# Patient Record
Sex: FEMALE | Race: WHITE | NOT HISPANIC OR LATINO | URBAN - METROPOLITAN AREA
[De-identification: names, ages, dates, MRNs, and addresses within clinical notes are randomized per-mention and may not be internally consistent; named-entity substitution may affect disease eponyms.]

---

## 2017-02-27 ENCOUNTER — OFFICE (OUTPATIENT)
Dept: URBAN - METROPOLITAN AREA CLINIC 78 | Facility: CLINIC | Age: 37
End: 2017-02-27
Payer: MEDICAID

## 2017-02-27 VITALS
WEIGHT: 202 LBS | DIASTOLIC BLOOD PRESSURE: 74 MMHG | TEMPERATURE: 97.9 F | HEIGHT: 61 IN | SYSTOLIC BLOOD PRESSURE: 109 MMHG | HEART RATE: 74 BPM

## 2017-02-27 DIAGNOSIS — K21.0 GASTRO-ESOPHAGEAL REFLUX DISEASE WITH ESOPHAGITIS: ICD-10-CM

## 2017-02-27 DIAGNOSIS — R14.2 ERUCTATION: ICD-10-CM

## 2017-02-27 PROCEDURE — 99214 OFFICE O/P EST MOD 30 MIN: CPT

## 2017-02-27 NOTE — SERVICEHPINOTES
38 yo female presents for f/u heartburn, gas, and bloating. She notes improvement in heartburn with Dexilant but has had worsened issues with gas and bloating. Can still have episodes of acid reflux. Symptoms tend to worsen later in the day. She has cut back considerably on sodas and junk food and has lost some weight. Denies abdominal pain. She reports 1-2 normal BMs per day. Her EGD in October showed benign findings. Just had labs with her PCP last week - no results yet.

## 2020-08-06 ENCOUNTER — TELEHEALTH PROVIDED OTHER THAN IN PATIENT'S HOME (OUTPATIENT)
Dept: URBAN - METROPOLITAN AREA TELEHEALTH 7 | Facility: TELEHEALTH | Age: 40
End: 2020-08-06
Payer: MEDICAID

## 2020-08-06 VITALS — HEIGHT: 61 IN | WEIGHT: 193 LBS

## 2020-08-06 DIAGNOSIS — R11.0 NAUSEA: ICD-10-CM

## 2020-08-06 DIAGNOSIS — R14.0 ABDOMINAL DISTENSION (GASEOUS): ICD-10-CM

## 2020-08-06 DIAGNOSIS — R12 HEARTBURN: ICD-10-CM

## 2020-08-06 DIAGNOSIS — R19.4 CHANGE IN BOWEL HABIT: ICD-10-CM

## 2020-08-06 PROCEDURE — 99204 OFFICE O/P NEW MOD 45 MIN: CPT | Mod: 95 | Performed by: PHYSICIAN ASSISTANT

## 2020-08-06 RX ORDER — PANTOPRAZOLE SODIUM 40 MG/1
TABLET, DELAYED RELEASE ORAL
Qty: 0 | Refills: 0 | Status: COMPLETED
End: 2021-02-11

## 2020-08-06 NOTE — SERVICEHPINOTES
PATIENT VERIFIED BY DATE OF BIRTH AND NAME. Patient has been consented for this telecommunication visit.   JUSTA REYES   is a   40   year old    female who is being seen in consultation at the request of   ISHMAEL MARIE   for digestive symptoms. Patient notes h/o GERD but in the past 2 months she notes frequent nausea, bloating, belching, and heartburn. She has symptoms daily, can be worse toward evening. She has tried multiple OTC meds. At first, Gas-X helped for about 5 days but then stopped working. Acid blocking meds have not been helpful. IBgard also not helpful. Patient reports an abdominal U/S recently and was told it was normal. She notes irregular bowel habits which started at same time as other symptoms. She will go up to 5 days without a BM, then can have diarrhea, then cycles back to constipation. No blood in stools. No vomiting or fevers. She had a partial hysterectomy on May 5th. She started having all her GI symptoms in mid-June. Seen here in 2016/2017 for heartburn, gas, and bloating. At that time, she had improvement in heartburn with Dexilant but had worsened issues with gas and bloating. Symptoms tended to worsen later in the day. Her EGD in October 2016 showed benign findings.

## 2020-08-17 LAB
AMBIG ABBREV CMP14 DEFAULT: (no result)
CBC/DIFF AMBIGUOUS DEFAULT: BASO (ABSOLUTE): 0 X10E3/UL (ref 0–0.2)
CBC/DIFF AMBIGUOUS DEFAULT: BASOS: 1 %
CBC/DIFF AMBIGUOUS DEFAULT: EOS (ABSOLUTE): 0.1 X10E3/UL (ref 0–0.4)
CBC/DIFF AMBIGUOUS DEFAULT: EOS: 2 %
CBC/DIFF AMBIGUOUS DEFAULT: HEMATOCRIT: 40.3 % (ref 34–46.6)
CBC/DIFF AMBIGUOUS DEFAULT: HEMATOLOGY COMMENTS: (no result)
CBC/DIFF AMBIGUOUS DEFAULT: HEMOGLOBIN: 12.4 G/DL (ref 11.1–15.9)
CBC/DIFF AMBIGUOUS DEFAULT: IMMATURE CELLS: (no result)
CBC/DIFF AMBIGUOUS DEFAULT: IMMATURE GRANS (ABS): 0 X10E3/UL (ref 0–0.1)
CBC/DIFF AMBIGUOUS DEFAULT: IMMATURE GRANULOCYTES: 0 %
CBC/DIFF AMBIGUOUS DEFAULT: LYMPHS (ABSOLUTE): 1.7 X10E3/UL (ref 0.7–3.1)
CBC/DIFF AMBIGUOUS DEFAULT: LYMPHS: 26 %
CBC/DIFF AMBIGUOUS DEFAULT: MCH: 29.1 PG (ref 26.6–33)
CBC/DIFF AMBIGUOUS DEFAULT: MCHC: 30.8 G/DL — LOW (ref 31.5–35.7)
CBC/DIFF AMBIGUOUS DEFAULT: MCV: 95 FL (ref 79–97)
CBC/DIFF AMBIGUOUS DEFAULT: MONOCYTES(ABSOLUTE): 0.5 X10E3/UL (ref 0.1–0.9)
CBC/DIFF AMBIGUOUS DEFAULT: MONOCYTES: 7 %
CBC/DIFF AMBIGUOUS DEFAULT: NEUTROPHILS (ABSOLUTE): 4.3 X10E3/UL (ref 1.4–7)
CBC/DIFF AMBIGUOUS DEFAULT: NEUTROPHILS: 64 %
CBC/DIFF AMBIGUOUS DEFAULT: NRBC: (no result)
CBC/DIFF AMBIGUOUS DEFAULT: PLATELETS: 359 X10E3/UL (ref 150–450)
CBC/DIFF AMBIGUOUS DEFAULT: RBC: 4.26 X10E6/UL (ref 3.77–5.28)
CBC/DIFF AMBIGUOUS DEFAULT: RDW: 14.3 % (ref 11.7–15.4)
CBC/DIFF AMBIGUOUS DEFAULT: WBC: 6.6 X10E3/UL (ref 3.4–10.8)
COMP. METABOLIC PANEL (14): A/G RATIO: 1.6 (ref 1.2–2.2)
COMP. METABOLIC PANEL (14): ALBUMIN: 4.1 G/DL (ref 3.8–4.8)
COMP. METABOLIC PANEL (14): ALKALINE PHOSPHATASE: 81 IU/L (ref 39–117)
COMP. METABOLIC PANEL (14): ALT (SGPT): 16 IU/L (ref 0–32)
COMP. METABOLIC PANEL (14): AST (SGOT): 13 IU/L (ref 0–40)
COMP. METABOLIC PANEL (14): BILIRUBIN, TOTAL: <0.2 MG/DL
COMP. METABOLIC PANEL (14): BUN/CREATININE RATIO: 6 — LOW (ref 9–23)
COMP. METABOLIC PANEL (14): BUN: 6 MG/DL (ref 6–24)
COMP. METABOLIC PANEL (14): CALCIUM: 9.5 MG/DL (ref 8.7–10.2)
COMP. METABOLIC PANEL (14): CARBON DIOXIDE, TOTAL: 21 MMOL/L (ref 20–29)
COMP. METABOLIC PANEL (14): CHLORIDE: 109 MMOL/L — HIGH (ref 96–106)
COMP. METABOLIC PANEL (14): CREATININE: 0.98 MG/DL (ref 0.57–1)
COMP. METABOLIC PANEL (14): EGFR IF AFRICN AM: 82 ML/MIN/1.73 (ref 59–?)
COMP. METABOLIC PANEL (14): EGFR IF NONAFRICN AM: 71 ML/MIN/1.73 (ref 59–?)
COMP. METABOLIC PANEL (14): GLOBULIN, TOTAL: 2.5 G/DL (ref 1.5–4.5)
COMP. METABOLIC PANEL (14): GLUCOSE: 118 MG/DL — HIGH (ref 65–99)
COMP. METABOLIC PANEL (14): POTASSIUM: 4 MMOL/L (ref 3.5–5.2)
COMP. METABOLIC PANEL (14): PROTEIN, TOTAL: 6.6 G/DL (ref 6–8.5)
COMP. METABOLIC PANEL (14): SODIUM: 144 MMOL/L (ref 134–144)

## 2020-08-24 ENCOUNTER — OFFICE (OUTPATIENT)
Dept: URBAN - METROPOLITAN AREA CLINIC 34 | Facility: CLINIC | Age: 40
End: 2020-08-24
Payer: MEDICAID

## 2020-08-24 DIAGNOSIS — Z11.59 ENCOUNTER FOR SCREENING FOR OTHER VIRAL DISEASES: ICD-10-CM

## 2020-08-24 PROCEDURE — 99211 OFF/OP EST MAY X REQ PHY/QHP: CPT | Mod: 25,CS | Performed by: INTERNAL MEDICINE

## 2020-08-27 ENCOUNTER — OFFICE (OUTPATIENT)
Dept: URBAN - METROPOLITAN AREA CLINIC 98 | Facility: CLINIC | Age: 40
End: 2020-08-27
Payer: MEDICAID

## 2020-08-27 VITALS
DIASTOLIC BLOOD PRESSURE: 49 MMHG | RESPIRATION RATE: 12 BRPM | SYSTOLIC BLOOD PRESSURE: 126 MMHG | DIASTOLIC BLOOD PRESSURE: 78 MMHG | OXYGEN SATURATION: 97 % | OXYGEN SATURATION: 95 % | HEART RATE: 83 BPM | SYSTOLIC BLOOD PRESSURE: 114 MMHG | OXYGEN SATURATION: 90 % | SYSTOLIC BLOOD PRESSURE: 107 MMHG | TEMPERATURE: 97.9 F | RESPIRATION RATE: 25 BRPM | DIASTOLIC BLOOD PRESSURE: 69 MMHG | SYSTOLIC BLOOD PRESSURE: 137 MMHG | HEART RATE: 105 BPM | SYSTOLIC BLOOD PRESSURE: 120 MMHG | HEART RATE: 93 BPM | RESPIRATION RATE: 26 BRPM | DIASTOLIC BLOOD PRESSURE: 72 MMHG | OXYGEN SATURATION: 99 % | HEART RATE: 108 BPM | SYSTOLIC BLOOD PRESSURE: 115 MMHG | WEIGHT: 193 LBS | HEIGHT: 61 IN | SYSTOLIC BLOOD PRESSURE: 112 MMHG | HEART RATE: 86 BPM | OXYGEN SATURATION: 100 % | OXYGEN SATURATION: 96 % | RESPIRATION RATE: 16 BRPM | HEART RATE: 89 BPM | HEART RATE: 104 BPM | TEMPERATURE: 98.1 F | DIASTOLIC BLOOD PRESSURE: 80 MMHG | DIASTOLIC BLOOD PRESSURE: 92 MMHG

## 2020-08-27 DIAGNOSIS — R12 HEARTBURN: ICD-10-CM

## 2020-08-27 DIAGNOSIS — K31.89 OTHER DISEASES OF STOMACH AND DUODENUM: ICD-10-CM

## 2020-08-27 DIAGNOSIS — R14.0 ABDOMINAL DISTENSION (GASEOUS): ICD-10-CM

## 2020-08-27 DIAGNOSIS — R11.0 NAUSEA: ICD-10-CM

## 2020-08-27 DIAGNOSIS — K29.60 OTHER GASTRITIS WITHOUT BLEEDING: ICD-10-CM

## 2020-08-27 RX ORDER — LINACLOTIDE 145 UG/1
145 CAPSULE, GELATIN COATED ORAL
Qty: 16 | Refills: 0 | Status: COMPLETED
Start: 2020-08-27 | End: 2022-08-31

## 2020-08-27 NOTE — SERVICEHPINOTES
Pt presents for endoscopic evaluation. She had planned to have both EGD and colonoscopy, but she was unable to tolerate any of the prep. Instead, she prefers to proceed with EGD alone. She has been having symptoms of bloating, heartburn, and nausea since surgery 5/2020. Constipated, going 5-9 days without BM strains to pass stool, but incomplete evacuation afterwards.

## 2021-02-11 ENCOUNTER — OFFICE (OUTPATIENT)
Dept: URBAN - METROPOLITAN AREA TELEHEALTH 12 | Facility: TELEHEALTH | Age: 41
End: 2021-02-11
Payer: MEDICAID

## 2021-02-11 VITALS — WEIGHT: 184 LBS | HEIGHT: 61 IN

## 2021-02-11 DIAGNOSIS — K21.00 GASTRO-ESOPHAGEAL REFLUX DISEASE WITH ESOPHAGITIS, WITHOUT B: ICD-10-CM

## 2021-02-11 DIAGNOSIS — R11.0 NAUSEA: ICD-10-CM

## 2021-02-11 DIAGNOSIS — Z86.16 PERSONAL HISTORY OF COVID-19: ICD-10-CM

## 2021-02-11 PROCEDURE — 99442: CPT | Performed by: INTERNAL MEDICINE

## 2021-02-11 NOTE — SERVICEHPINOTES
PATIENT VERIFIED BY DATE OF BIRTH AND NAME. Patient has been consented for this telecommunication visit.Pt presents for evaluation of nausea. Had been in usual state until 12/2020 when she contracted covid-19. Ever since then she has had issues with nausea she had EGD 8/2020 and had been relatively symptom-free GI-wise until covid. She reports constant nausea along with diminished PO intake. Present throughout the day, variable intensity though. Worse at night and sometimes wakes her from sleep also worse after eating, anywhere from immediately to an hour or so later. Sometimes worse with certain movements (rolling over in bed). Not eating much, but has been able to advance diet to some degree symptoms affecting work and quality of life. Vomits and regurgitates, usually undigested food. Has been taking zofran, which 9.9/10 times helps. Has been trying to eat small meals, grazing throughout the day. BMs are regular for her, no changes. Labs from PCP reportedly unremarkable. For past several days she has not needed zofran but then needed it last night, worst it had ever been. Had been otherwise improving until last night. She continues on her GERD regimen, no association of GERD with her symptoms (and vice versa).EGD 8/2020 showed mild non-erosive gastritis (confirmed on biopsies, also reactive gastropathy), but otherwise was unremarkable (biopsies from duodenum suspicious for celiac, but celiac profile negative).BRUS 7/2020 was unremarkable.10-point ROS completed with patient, pertinent positives/negatives outlined above.

## 2021-05-04 ENCOUNTER — TELEHEALTH PROVIDED OTHER THAN IN PATIENT'S HOME (OUTPATIENT)
Dept: URBAN - METROPOLITAN AREA TELEHEALTH 12 | Facility: TELEHEALTH | Age: 41
End: 2021-05-04
Payer: MEDICAID

## 2021-05-04 VITALS — WEIGHT: 184 LBS | HEIGHT: 61 IN

## 2021-05-04 DIAGNOSIS — R11.2 NAUSEA WITH VOMITING, UNSPECIFIED: ICD-10-CM

## 2021-05-04 DIAGNOSIS — E66.9 OBESITY, UNSPECIFIED: ICD-10-CM

## 2021-05-04 DIAGNOSIS — K30 FUNCTIONAL DYSPEPSIA: ICD-10-CM

## 2021-05-04 PROCEDURE — 99214 OFFICE O/P EST MOD 30 MIN: CPT | Mod: 95 | Performed by: PHYSICIAN ASSISTANT

## 2021-05-04 NOTE — SERVICEHPINOTES
PATIENT VERIFIED BY DATE OF BIRTH AND NAME. Patient has been consented for this telecommunication visit.   Ms. Galindo is here for f/u regarding nausea.   Had been in usual state until 12/2020 when she contracted covid-19. Ever since then she has had issues with nausea she had EGD 8/2020 and had been relatively symptom-free GI-wise until covid. She saw Dr. Deng who obtained a GI emptying scan which showed mildly delayed stomach emptying. She is worse with eating. Eating crackers, popsicles, bread, potatoes--don't tend to cause as much nausea. + early satiety. + postprandial bloating. She has to take off her bra after eating as any pressure on the abdomen causes issues. She took Reglan in the past and developed RLS and TD. Hasn't had blood work recently. She states she will "be ok" as long as she knows it is ok to take Zofran. As far as the zofran, she is using it a few times a week lately. No known DM or thyroid disease.   ROS as per HPI and otherwise is negative

## 2021-05-06 LAB
HEMOGLOBIN A1C: 5.5 % (ref 4.8–5.6)
THYROXINE (T4) FREE, DIRECT, S: T4,FREE(DIRECT): 0.99 NG/DL (ref 0.82–1.77)
TSH: 1.66 UIU/ML (ref 0.45–4.5)

## 2021-06-11 ENCOUNTER — TELEHEALTH PROVIDED OTHER THAN IN PATIENT'S HOME (OUTPATIENT)
Dept: URBAN - METROPOLITAN AREA TELEHEALTH 12 | Facility: TELEHEALTH | Age: 41
End: 2021-06-11
Payer: MEDICAID

## 2021-06-11 VITALS — HEIGHT: 61 IN | WEIGHT: 177 LBS

## 2021-06-11 DIAGNOSIS — R19.4 CHANGE IN BOWEL HABIT: ICD-10-CM

## 2021-06-11 DIAGNOSIS — K30 FUNCTIONAL DYSPEPSIA: ICD-10-CM

## 2021-06-11 DIAGNOSIS — R11.2 NAUSEA WITH VOMITING, UNSPECIFIED: ICD-10-CM

## 2021-06-11 PROCEDURE — 99213 OFFICE O/P EST LOW 20 MIN: CPT | Mod: 95 | Performed by: INTERNAL MEDICINE

## 2021-06-11 RX ORDER — ONDANSETRON HYDROCHLORIDE 4 MG/1
12 TABLET, FILM COATED ORAL
Qty: 30 | Refills: 3 | Status: COMPLETED
Start: 2021-01-12 | End: 2022-08-31

## 2021-06-11 NOTE — SERVICEHPINOTES
PATIENT VERIFIED BY DATE OF BIRTH AND NAME. Patient has been consented for this telecommunication visit.Pt presents for follow up of ongoing constant nausea and vomiting. Past few days has been OK, has been able to eat more. But prior to that she would have symptoms nearly every day. Has lost weight, about 20 lbs since onset of symptoms. Nothing clearly worsens symptoms just mere act of eating/drinking can trigger symptoms. No food associations. Zofran helps she takes this BID on "bad days" though some days she won't need it. Hasn't tried much else to help. Tried reglan previously, had adverse reaction (TD) to it. Has recently tried IBguard, which has been may have been helpful over the past week or so. BMs continue to vary - can have days of diarrhea (and vomiting), but then can go as long as 9 days without having BM. Nausea/vomiting definitely worse when constipated. Ongoing symptoms are affecting quality of life, family relations, etc. She distinctly correlates onset of symptoms to diagnosis of COVID-19 Dec 2020 prior tot hat she had been relatively symptom-free.Gastric emptying scan 3/2021 showed mildly delayed emptying.BRBarium swallow 4/2021 was normal.BRLab testing shows normal thyroid function and normal HbA1c (5.5%).BRPrevious EGD 8/2020 showed mild non-erosive gastritis (confirmed on biopsies, also reactive gastropathy), but otherwise was unremarkable (biopsies from duodenum suspicious for celiac, but celiac profile negative).BRUS 7/2020 was unremarkable.10-point ROS completed with patient, pertinent positives/negatives outlined above.

## 2021-06-29 PROBLEM — K31.89 OTHER DISEASES OF STOMACH AND DUODENUM: Status: ACTIVE | Noted: 2020-08-27

## 2021-08-17 ENCOUNTER — TELEHEALTH PROVIDED OTHER THAN IN PATIENT'S HOME (OUTPATIENT)
Dept: URBAN - METROPOLITAN AREA TELEHEALTH 3 | Facility: TELEHEALTH | Age: 41
End: 2021-08-17
Payer: MEDICAID

## 2021-08-17 VITALS — WEIGHT: 177 LBS | HEIGHT: 61 IN

## 2021-08-17 DIAGNOSIS — K59.09 OTHER CONSTIPATION: ICD-10-CM

## 2021-08-17 DIAGNOSIS — R11.2 NAUSEA WITH VOMITING, UNSPECIFIED: ICD-10-CM

## 2021-08-17 DIAGNOSIS — K30 FUNCTIONAL DYSPEPSIA: ICD-10-CM

## 2021-08-17 PROCEDURE — 99213 OFFICE O/P EST LOW 20 MIN: CPT | Mod: 95 | Performed by: INTERNAL MEDICINE

## 2021-08-17 NOTE — SERVICEHPINOTES
PATIENT VERIFIED BY DATE OF BIRTH AND NAME. Patient has been consented for this telecommunication visit. Pt presents for follow up of nausea/delayed gastric emptying. Since last visit, she has done much better. She recounts a total of 7 days of feeling nauseated and maybe vomited 4-5 of those days. Has started to regain weight as a result. Slightly nauseated this week, but otherwise improving. No triggers to her episodes, though may correlate with constipation. She continues on regimen of pantoprazole in the morning, Prilosec in the evening has not needed nor taken Zofran since last visit. Some issues with gas/belching ongoing issues with constipation, sometimes going 4-5 days without BM. She takes linzess 145mcg (usually 1 capsule, sometimes 2 capsules) and stool softeners strains to pass BM, incomplete evacuation afterwards. Somewhat reluctant to try higher dose of linzess for fear of diarrhea. She has previously tried reglan previously, had adverse reaction (TD) to it previously tried IBguard, but stopped this. She distinctly correlates onset of symptoms to diagnosis of COVID-19 Dec 2020 prior to that she had been relatively symptom-free.Also interested in medical weight loss program and has questions about wegovy. PCP thinks it may be a good fit for her, but concerned about potential GI side effects (and prefers GI to prescribe it).Gastric emptying scan 3/2021 showed mildly delayed emptying.BRBarium swallow 4/2021 was normal.BRLab testing shows normal thyroid function and normal HbA1c (5.5%).BRPrevious EGD 8/2020 showed mild non-erosive gastritis (confirmed on biopsies, also reactive gastropathy), but otherwise was unremarkable (biopsies from duodenum suspicious for celiac, but celiac profile negative).BRUS 7/2020 was unremarkable.10-point ROS completed with patient, pertinent positives/negatives outlined above.

## 2021-11-04 ENCOUNTER — OFFICE (OUTPATIENT)
Dept: URBAN - METROPOLITAN AREA CLINIC 34 | Facility: CLINIC | Age: 41
End: 2021-11-04
Payer: MEDICAID

## 2021-11-04 VITALS
DIASTOLIC BLOOD PRESSURE: 87 MMHG | TEMPERATURE: 97.9 F | SYSTOLIC BLOOD PRESSURE: 123 MMHG | HEIGHT: 61 IN | WEIGHT: 187 LBS | HEART RATE: 111 BPM

## 2021-11-04 DIAGNOSIS — K59.09 OTHER CONSTIPATION: ICD-10-CM

## 2021-11-04 DIAGNOSIS — R11.2 NAUSEA WITH VOMITING, UNSPECIFIED: ICD-10-CM

## 2021-11-04 DIAGNOSIS — K30 FUNCTIONAL DYSPEPSIA: ICD-10-CM

## 2021-11-04 PROBLEM — K31.89 OTHER DISEASES OF STOMACH AND DUODENUM: Status: ACTIVE | Noted: 2020-08-27

## 2021-11-04 PROCEDURE — 99213 OFFICE O/P EST LOW 20 MIN: CPT | Performed by: INTERNAL MEDICINE

## 2022-02-11 ENCOUNTER — TELEHEALTH PROVIDED OTHER THAN IN PATIENT'S HOME (OUTPATIENT)
Dept: URBAN - METROPOLITAN AREA TELEHEALTH 3 | Facility: TELEHEALTH | Age: 42
End: 2022-02-11
Payer: MEDICAID

## 2022-02-11 VITALS — WEIGHT: 185 LBS | HEIGHT: 61 IN

## 2022-02-11 DIAGNOSIS — K30 FUNCTIONAL DYSPEPSIA: ICD-10-CM

## 2022-02-11 DIAGNOSIS — R11.2 NAUSEA WITH VOMITING, UNSPECIFIED: ICD-10-CM

## 2022-02-11 DIAGNOSIS — K59.09 OTHER CONSTIPATION: ICD-10-CM

## 2022-02-11 PROCEDURE — 99214 OFFICE O/P EST MOD 30 MIN: CPT | Mod: 95 | Performed by: INTERNAL MEDICINE

## 2022-02-11 NOTE — SERVICEHPINOTES
PATIENT VERIFIED BY DATE OF BIRTH AND NAME. Patient has been consented for this telecommunication visit.
mary hernandez
Pt presents for follow up. For the past week she has been doing better, though rough several weeks Dec-Jan. Had some type of sinus infection (not COVID), then worsening GI symptoms. Unable to tolerate any type of PO intake nauseated with medications and carrying emesis bags with her. Saw PCP, given amoxicillin, symptoms gradually resolved. Now feels fine.
mary hernandez Has follow up planned with Dr. Miranda in the coming weeks.
mary hernandez 
Since her recent "flare" she has had a number of other symptoms - disordered sense of smell ("stronger than usual"), nausea at sight of food, headaches, etc. mary hernandez 
Still having BM once per week despite linzess stool softeners daily metamucil occasionally. Previously tried miralax, but now taste/sight if it can induce vomiting. Labs 1/2022: normal CBC, normal RBC indices normal CMP low iron saturation, but normal iron levels.Gastric emptying scan 3/2021 showed mildly delayed emptying.brBarium swallow 4/2021 was normal.brEGD 8/2020 showed mild non-erosive gastritis (confirmed on biopsies, also reactive gastropathy), but otherwise was unremarkable (biopsies from duodenum suspicious for celiac, but celiac profile negative).brUS 7/2020 was unremarkable.
mary hernandez  Studies with Dr. Miranda: HIDA (unremarkable), CT (retained food in stomach, small intestine), colonoscopy incomplete due to retained stool/incomplete prep.
mary hernandez 10-point ROS completed with patient, pertinent positives/negatives outlined above.

## 2022-08-31 ENCOUNTER — OFFICE (OUTPATIENT)
Dept: URBAN - METROPOLITAN AREA CLINIC 102 | Facility: CLINIC | Age: 42
End: 2022-08-31
Payer: MEDICAID

## 2022-08-31 VITALS
SYSTOLIC BLOOD PRESSURE: 124 MMHG | TEMPERATURE: 97.7 F | DIASTOLIC BLOOD PRESSURE: 87 MMHG | HEIGHT: 61 IN | WEIGHT: 202 LBS | HEART RATE: 96 BPM

## 2022-08-31 DIAGNOSIS — R11.2 NAUSEA WITH VOMITING, UNSPECIFIED: ICD-10-CM

## 2022-08-31 DIAGNOSIS — K30 FUNCTIONAL DYSPEPSIA: ICD-10-CM

## 2022-08-31 DIAGNOSIS — K64.5 PERIANAL VENOUS THROMBOSIS: ICD-10-CM

## 2022-08-31 PROCEDURE — 99214 OFFICE O/P EST MOD 30 MIN: CPT | Performed by: INTERNAL MEDICINE

## 2022-08-31 RX ORDER — ONDANSETRON HYDROCHLORIDE 4 MG/1
12 TABLET, FILM COATED ORAL
Qty: 40 | Refills: 3 | Status: COMPLETED
Start: 2022-08-31 | End: 2022-10-18

## 2022-10-18 ENCOUNTER — OFFICE (OUTPATIENT)
Dept: URBAN - METROPOLITAN AREA CLINIC 34 | Facility: CLINIC | Age: 42
End: 2022-10-18
Payer: MEDICAID

## 2022-10-18 VITALS
DIASTOLIC BLOOD PRESSURE: 86 MMHG | SYSTOLIC BLOOD PRESSURE: 129 MMHG | HEIGHT: 61 IN | HEART RATE: 101 BPM | WEIGHT: 205 LBS

## 2022-10-18 DIAGNOSIS — K60.2 ANAL FISSURE, UNSPECIFIED: ICD-10-CM

## 2022-10-18 DIAGNOSIS — R11.2 NAUSEA WITH VOMITING, UNSPECIFIED: ICD-10-CM

## 2022-10-18 DIAGNOSIS — K30 FUNCTIONAL DYSPEPSIA: ICD-10-CM

## 2022-10-18 DIAGNOSIS — Z86.16 PERSONAL HISTORY OF COVID-19: ICD-10-CM

## 2022-10-18 PROCEDURE — 99214 OFFICE O/P EST MOD 30 MIN: CPT | Performed by: INTERNAL MEDICINE

## 2022-10-18 RX ORDER — GABAPENTIN 100 MG/1
100 CAPSULE ORAL
Qty: 30 | Refills: 11 | Status: COMPLETED
End: 2023-04-27

## 2023-01-05 ENCOUNTER — OFFICE (OUTPATIENT)
Dept: URBAN - METROPOLITAN AREA CLINIC 34 | Facility: CLINIC | Age: 43
End: 2023-01-05
Payer: MEDICAID

## 2023-01-05 VITALS
SYSTOLIC BLOOD PRESSURE: 135 MMHG | WEIGHT: 209 LBS | TEMPERATURE: 97.7 F | HEIGHT: 61 IN | HEART RATE: 94 BPM | DIASTOLIC BLOOD PRESSURE: 86 MMHG

## 2023-01-05 DIAGNOSIS — K30 FUNCTIONAL DYSPEPSIA: ICD-10-CM

## 2023-01-05 DIAGNOSIS — Z86.16 PERSONAL HISTORY OF COVID-19: ICD-10-CM

## 2023-01-05 DIAGNOSIS — R11.2 NAUSEA WITH VOMITING, UNSPECIFIED: ICD-10-CM

## 2023-01-05 PROCEDURE — 99214 OFFICE O/P EST MOD 30 MIN: CPT | Performed by: INTERNAL MEDICINE

## 2023-01-05 RX ORDER — OMEPRAZOLE MAGNESIUM 20.6 MG/1
TABLET, DELAYED RELEASE ORAL
Refills: 0 | Status: COMPLETED
End: 2023-01-05

## 2023-02-09 ENCOUNTER — OFFICE (OUTPATIENT)
Dept: URBAN - METROPOLITAN AREA CLINIC 79 | Facility: CLINIC | Age: 43
End: 2023-02-09
Payer: MEDICAID

## 2023-02-09 VITALS
HEIGHT: 61 IN | DIASTOLIC BLOOD PRESSURE: 80 MMHG | TEMPERATURE: 98.1 F | HEART RATE: 84 BPM | SYSTOLIC BLOOD PRESSURE: 120 MMHG | WEIGHT: 212 LBS

## 2023-02-09 DIAGNOSIS — K31.84 GASTROPARESIS: ICD-10-CM

## 2023-02-09 DIAGNOSIS — Z86.16 PERSONAL HISTORY OF COVID-19: ICD-10-CM

## 2023-02-09 DIAGNOSIS — R10.12 LEFT UPPER QUADRANT PAIN: ICD-10-CM

## 2023-02-09 PROCEDURE — 99214 OFFICE O/P EST MOD 30 MIN: CPT | Performed by: INTERNAL MEDICINE

## 2023-04-03 ENCOUNTER — ON CAMPUS - OUTPATIENT (OUTPATIENT)
Dept: URBAN - METROPOLITAN AREA HOSPITAL 16 | Facility: HOSPITAL | Age: 43
End: 2023-04-03
Payer: MEDICAID

## 2023-04-03 DIAGNOSIS — K31.84 GASTROPARESIS: ICD-10-CM

## 2023-04-03 DIAGNOSIS — K30 FUNCTIONAL DYSPEPSIA: ICD-10-CM

## 2023-04-03 DIAGNOSIS — K29.60 OTHER GASTRITIS WITHOUT BLEEDING: ICD-10-CM

## 2023-04-03 PROCEDURE — 43239 EGD BIOPSY SINGLE/MULTIPLE: CPT | Performed by: INTERNAL MEDICINE

## 2023-04-27 ENCOUNTER — OFFICE (OUTPATIENT)
Dept: URBAN - METROPOLITAN AREA CLINIC 79 | Facility: CLINIC | Age: 43
End: 2023-04-27
Payer: MEDICAID

## 2023-04-27 VITALS
WEIGHT: 203 LBS | TEMPERATURE: 98.4 F | DIASTOLIC BLOOD PRESSURE: 77 MMHG | HEIGHT: 61 IN | HEART RATE: 98 BPM | SYSTOLIC BLOOD PRESSURE: 114 MMHG

## 2023-04-27 DIAGNOSIS — K31.84 GASTROPARESIS: ICD-10-CM

## 2023-04-27 PROCEDURE — 99213 OFFICE O/P EST LOW 20 MIN: CPT | Performed by: INTERNAL MEDICINE

## 2023-09-11 ENCOUNTER — OFFICE (OUTPATIENT)
Dept: URBAN - METROPOLITAN AREA CLINIC 79 | Facility: CLINIC | Age: 43
End: 2023-09-11

## 2023-09-11 PROCEDURE — 00001: CPT | Performed by: INTERNAL MEDICINE

## 2025-08-27 ENCOUNTER — TELEHEALTH PROVIDED IN PATIENT'S HOME (OUTPATIENT)
Dept: URBAN - METROPOLITAN AREA TELEHEALTH 3 | Facility: TELEHEALTH | Age: 45
End: 2025-08-27
Payer: MEDICAID

## 2025-08-27 VITALS — WEIGHT: 240 LBS | HEIGHT: 61 IN

## 2025-08-27 DIAGNOSIS — R14.0 ABDOMINAL DISTENSION (GASEOUS): ICD-10-CM

## 2025-08-27 DIAGNOSIS — K59.09 OTHER CONSTIPATION: ICD-10-CM

## 2025-08-27 DIAGNOSIS — R19.4 CHANGE IN BOWEL HABIT: ICD-10-CM

## 2025-08-27 DIAGNOSIS — E66.9 OBESITY, UNSPECIFIED: ICD-10-CM

## 2025-08-27 DIAGNOSIS — R11.2 NAUSEA WITH VOMITING, UNSPECIFIED: ICD-10-CM

## 2025-08-27 DIAGNOSIS — K21.00 GASTRO-ESOPHAGEAL REFLUX DISEASE WITH ESOPHAGITIS, WITHOUT B: ICD-10-CM

## 2025-08-27 DIAGNOSIS — K31.84 GASTROPARESIS: ICD-10-CM

## 2025-08-27 PROCEDURE — 99214 OFFICE O/P EST MOD 30 MIN: CPT | Mod: 95 | Performed by: INTERNAL MEDICINE

## 2025-08-27 RX ORDER — PRUCALOPRIDE 2 MG/1
2 TABLET, FILM COATED ORAL
Qty: 30 | Refills: 5 | Status: ACTIVE
Start: 2025-08-27